# Patient Record
Sex: FEMALE | Race: WHITE
[De-identification: names, ages, dates, MRNs, and addresses within clinical notes are randomized per-mention and may not be internally consistent; named-entity substitution may affect disease eponyms.]

---

## 2017-09-12 NOTE — EDM.PDOC
ED HPI GENERAL MEDICAL PROBLEM





- General


Chief Complaint: Exposure to Heat or Cold


Stated Complaint: NAUSEA / VOMITING


Time Seen by Provider: 09/12/17 19:13


Source of Information: Reports: Patient


History Limitations: Reports: No Limitations





- History of Present Illness


INITIAL COMMENTS - FREE TEXT/NARRATIVE: 





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 She said she got really hot. Afterwards she took a 

shower and then drank some fluids. She had some vomiting after that. Now she 

feels very cold thirsty and has some nausea. She describes her self is healthy. 

She does take aspirin and a statin. She denies any problems with eyes ears nose 

or throat. There is no chest pain palpitations cough or shortness of breath. 

She denies any abdominal pain. No urinary symptoms. No problems with bones or 

joints





- Related Data


 Allergies











Allergy/AdvReac Type Severity Reaction Status Date / Time


 


No Known Allergies Allergy   Verified 09/12/17 19:05











Home Meds: 


 Home Meds





Aspirin 81 mg PO DAILY 09/12/17 [History]











Past Medical History


HEENT History: Reports: Impaired Vision


Cardiovascular History: Reports: High Cholesterol


Gastrointestinal History: Reports: Diverticulosis


OB/GYN History: Reports: Pregnancy


Musculoskeletal History: Reports: Fracture





- Infectious Disease History


Infectious Disease History: Reports: Chicken Pox, Mumps





- Past Surgical History


GI Surgical History: Reports: Other (See Below)


Other GI Surgeries/Procedures: inverse organs?





Social & Family History





- Tobacco Use


Smoking Status *Q: Never Smoker





- Caffeine Use


Caffeine Use: Reports: Coffee





- Recreational Drug Use


Recreational Drug Use: No





ED ROS GENERAL





- Review of Systems


Review Of Systems: See Below


Constitutional: Reports: Chills, Diaphoresis


HEENT: Reports: No Symptoms


Respiratory: Reports: No Symptoms


Cardiovascular: Reports: No Symptoms


Endocrine: Reports: No Symptoms


GI/Abdominal: Reports: Nausea, Vomiting


: Reports: No Symptoms


Musculoskeletal: Reports: No Symptoms


Skin: Reports: No Symptoms





ED EXAM, GENERAL





- Physical Exam


Exam: See Below


Exam Limited By: No Limitations


General Appearance: Alert, WD/WN, No Apparent Distress


Eye Exam: Bilateral Eye: Normal Inspection


Nose: Normal Inspection


Throat/Mouth: Normal Oropharynx


Head: Atraumatic


Neck: Normal Inspection


Respiratory/Chest: Lungs Clear, Normal Breath Sounds


Cardiovascular: Normal Peripheral Pulses, Regular Rate, Rhythm, No Murmur


Back Exam: Normal Inspection


Extremities: Normal Inspection


Neurological: Alert, Oriented


Psychiatric: Normal Affect


Skin Exam: Warm, Dry





Course





- Vital Signs


Last Recorded V/S: 





 Last Vital Signs











Temp  36.6 C   09/12/17 19:07


 


Pulse  76   09/12/17 19:07


 


Resp  16   09/12/17 19:07


 


BP  146/59 H  09/12/17 19:07


 


Pulse Ox  96   09/12/17 19:07














- Orders/Labs/Meds


Orders: 





 Active Orders 24 hr











 Category Date Time Status


 


 EKG Documentation Completion [RC] ASDIRECTED Care  09/12/17 20:19 Active


 


 Chest 2V [CR] Urgent Exams  09/12/17 20:19 Taken


 


 Sodium Chloride 0.9% [Normal Saline] 1,000 ml Med  09/12/17 19:30 Active





 IV ASDIRECTED   


 


 Sodium Chloride 0.9% [Normal Saline] 1,000 ml Med  09/12/17 20:30 Active





 IV ASDIRECTED   


 


 Sodium Chloride 0.9% [Saline Flush] Med  09/12/17 19:27 Active





 10 ml FLUSH ASDIRECTED PRN   


 


 Saline Lock Insert [OM.PC] Urgent Oth  09/12/17 19:27 Ordered


 


 EKG 12 Lead [EK] Urgent Ther  09/12/17 20:18 Ordered








 Medication Orders





Sodium Chloride (Normal Saline)  1,000 mls @ 999 mls/hr IV ASDIRECTED DARIANA


   Last Admin: 09/12/17 19:56  Dose: 999 mls/hr


Sodium Chloride (Normal Saline)  1,000 mls @ 999 mls/hr IV ASDIRECTED DARIANA


   Last Admin: 09/12/17 21:54  Dose: 999 mls/hr


Sodium Chloride (Saline Flush)  10 ml FLUSH ASDIRECTED PRN


   PRN Reason: Keep Vein Open


   Last Admin: 09/12/17 19:56  Dose: 10 ml








Labs: 





 Laboratory Tests











  09/12/17 09/12/17 09/12/17 Range/Units





  19:41 19:41 20:28 


 


WBC  17.4 H    (4.5-11.0)  K/uL


 


RBC  4.82    (3.30-5.50)  M/uL


 


Hgb  14.5    (12.0-15.0)  g/dL


 


Hct  43.8    (36.0-48.0)  %


 


MCV  91    (80-98)  fL


 


MCH  30    (27-31)  pg


 


MCHC  33    (32-36)  %


 


Plt Count  241    (150-400)  K/uL


 


Neut % (Auto)  87 H    (36-66)  %


 


Lymph % (Auto)  5 L    (24-44)  %


 


Mono % (Auto)  7 H    (2-6)  %


 


Eos % (Auto)  1 L    (2-4)  %


 


Baso % (Auto)  0    (0-1)  %


 


Sodium   142   (140-148)  mmol/L


 


Potassium   3.9   (3.6-5.2)  mmol/L


 


Chloride   105   (100-108)  mmol/L


 


Carbon Dioxide   29   (21-32)  mmol/L


 


Anion Gap   7.9   (5.0-14.0)  mmol/L


 


BUN   23 H   (7-18)  mg/dL


 


Creatinine   0.9   (0.6-1.0)  mg/dL


 


Est Cr Clr Drug Dosing   57.56   mL/min


 


Estimated GFR (MDRD)   > 60   (>60)  


 


Glucose   124 H   ()  mg/dL


 


Calcium   9.0   (8.5-10.1)  mg/dL


 


Total Bilirubin   0.3   (0.2-1.0)  mg/dL


 


AST   23   (15-37)  U/L


 


ALT   31   (12-78)  U/L


 


Alkaline Phosphatase   59   ()  U/L


 


Total Protein   7.8   (6.4-8.2)  g/dL


 


Albumin   3.9   (3.4-5.0)  g/dL


 


Globulin   3.9 H   (2.3-3.5)  g/dL


 


Albumin/Globulin Ratio   1.0 L   (1.2-2.2)  


 


Urine Color    Yellow  


 


Urine Appearance    Clear  


 


Urine pH    5.0  (4.5-8.0)  


 


Ur Specific Gravity    1.030  (1.008-1.030)  


 


Urine Protein    Trace  (NEGATIVE)  mg/dL


 


Urine Glucose (UA)    Normal  (NEGATIVE)  mg/dL


 


Urine Ketones    Negative  (NEGATIVE)  mg/dL


 


Urine Occult Blood    Negative  (NEGATIVE)  


 


Urine Nitrite    Negative  (NEGATIVE)  


 


Urine Bilirubin    Small  (NEGATIVE)  


 


Urine Urobilinogen    1  (NORMAL)  mg/dL


 


Ur Leukocyte Esterase    Large  (NEGATIVE)  


 


Urine RBC    0-5  (0-5)  


 


Urine WBC    5-10 H  (0-5)  


 


Ur Epithelial Cells    Few  


 


Amorphous Sediment    Rare  


 


Urine Bacteria    Moderate  


 


Urine Mucus    Numerous  


 


Urine Other      











Meds: 





Medications











Generic Name Dose Route Start Last Admin





  Trade Name Freq  PRN Reason Stop Dose Admin


 


Sodium Chloride  1,000 mls @ 999 mls/hr  09/12/17 19:30  09/12/17 19:56





  Normal Saline  IV   999 mls/hr





  ASDIRECTED DARIANA   Administration


 


Sodium Chloride  1,000 mls @ 999 mls/hr  09/12/17 20:30  09/12/17 21:54





  Normal Saline  IV   999 mls/hr





  ASDIRECTED DARIANA   Administration


 


Sodium Chloride  10 ml  09/12/17 19:27  09/12/17 19:56





  Saline Flush  FLUSH   10 ml





  ASDIRECTED PRN   Administration





  Keep Vein Open   














- Radiology Interpretation


Free Text/Narrative:: 





Chest x-ray shows normal heart size normal lung markings normal bony and soft 

tissues





- Re-Assessments/Exams


Free Text/Narrative Re-Assessment/Exam: 





09/12/17 22:14


An IV was established. She received 2 L IV normal saline. Afterwards she said 

she felt much better and ready to go home.





An EKG shows a sinus rhythm at 76 bpm there is a borderline short SD interval 

borderline right axis deviation T waves are flat in her anterior leads but I don

't see any T-wave inversions. There are no ST changes. She does have a 

prolonged QT interval.





Departure





- Departure


Time of Disposition: 22:15


Disposition: Home, Self-Care 01


Condition: Fair


Clinical Impression: 


 Heat exhaustion due to water depletion








- Discharge Information


Referrals: 


PCP,None [Primary Care Provider] - 


Additional Instructions: 


Be sure to drink plenty of liquids. Rest all day tomorrow in a cool place. 

There are some minor changes on your EKG that may be related to the dehydration 

and heat exhaustion. This is nothing to worry about but you should discuss it 

with your DrEmeli and have the EKG repeated within the next couple of weeks.


Once you have had a heat related illness such as this you or more susceptible 

to a repeat injury so just be careful be sure you drink plenty of liquids and 

avoid the heat





- My Orders


Last 24 Hours: 





My Active Orders





09/12/17 19:27


Sodium Chloride 0.9% [Saline Flush]   10 ml FLUSH ASDIRECTED PRN 


Saline Lock Insert [OM.PC] Urgent 





09/12/17 19:30


Sodium Chloride 0.9% [Normal Saline] 1,000 ml IV ASDIRECTED 





09/12/17 20:18


EKG 12 Lead [EK] Urgent 





09/12/17 20:19


EKG Documentation Completion [RC] ASDIRECTED 


Chest 2V [CR] Urgent 





09/12/17 20:30


Sodium Chloride 0.9% [Normal Saline] 1,000 ml IV ASDIRECTED 














- Assessment/Plan


Last 24 Hours: 





My Active Orders





09/12/17 19:27


Sodium Chloride 0.9% [Saline Flush]   10 ml FLUSH ASDIRECTED PRN 


Saline Lock Insert [OM.PC] Urgent 





09/12/17 19:30


Sodium Chloride 0.9% [Normal Saline] 1,000 ml IV ASDIRECTED 





09/12/17 20:18


EKG 12 Lead [EK] Urgent 





09/12/17 20:19


EKG Documentation Completion [RC] ASDIRECTED 


Chest 2V [CR] Urgent 





09/12/17 20:30


Sodium Chloride 0.9% [Normal Saline] 1,000 ml IV ASDIRECTED

## 2017-09-13 NOTE — CR
Heart size within normal limits. Pulmonary vasculature within normal limits. No focal consolidation.

## 2021-09-10 ENCOUNTER — HOSPITAL ENCOUNTER (INPATIENT)
Dept: HOSPITAL 11 - JP.ED | Age: 71
LOS: 3 days | Discharge: HOME | DRG: 392 | End: 2021-09-13
Attending: INTERNAL MEDICINE | Admitting: INTERNAL MEDICINE
Payer: MEDICARE

## 2021-09-10 DIAGNOSIS — Z79.899: ICD-10-CM

## 2021-09-10 DIAGNOSIS — E78.00: ICD-10-CM

## 2021-09-10 DIAGNOSIS — Z20.822: ICD-10-CM

## 2021-09-10 DIAGNOSIS — K57.20: Primary | ICD-10-CM

## 2021-09-10 DIAGNOSIS — H54.7: ICD-10-CM

## 2021-09-10 DIAGNOSIS — F43.21: ICD-10-CM

## 2021-09-10 DIAGNOSIS — Z79.82: ICD-10-CM

## 2021-09-10 PROCEDURE — U0002 COVID-19 LAB TEST NON-CDC: HCPCS

## 2021-09-10 PROCEDURE — 96376 TX/PRO/DX INJ SAME DRUG ADON: CPT

## 2021-09-10 PROCEDURE — C9113 INJ PANTOPRAZOLE SODIUM, VIA: HCPCS

## 2021-09-10 PROCEDURE — 96365 THER/PROPH/DIAG IV INF INIT: CPT

## 2021-09-10 PROCEDURE — 99285 EMERGENCY DEPT VISIT HI MDM: CPT

## 2021-09-10 PROCEDURE — 74177 CT ABD & PELVIS W/CONTRAST: CPT

## 2021-09-10 PROCEDURE — 96375 TX/PRO/DX INJ NEW DRUG ADDON: CPT

## 2021-09-10 NOTE — CRLCT
For Patients:  As a result of the 21st Century Cures Act, medical imaging 

exams and procedure reports are released immediately into your electronic 

medical record.  You may view this report before your referring provider.  

If you have questions, please contact your health care provider.



Indication:



Right lower quadrant pain, history of situs inversus



Technique:



Volumetric multidetector CT images of the abdomen and pelvis were obtained 

after the administration of intravenous contrast.



111 cc Isovue-300 low osmolar intravenous contrast



Comparison:



None available.



Findings:



There is bibasilar atelectasis.



There is diffuse situs inversus with complete mirrored reversal of all of 

the visceral organs. 



The liver is preserved in attenuation and enhancement without focal 

abnormality.



The portal vein is patent.



The gallbladder is unremarkable without evidence of radiopaque calculus.



There is no significant common biliary ductal dilatation or abrupt cut off.



The spleen is normal in enhancement and size.



There is mild focal thickening of the gastric antrum which can be 

associated with peptic ulcer disease otherwise the stomach is decompressed.



There is mild pancreatic atrophy.



The adrenal glands are unremarkable.



There are parapelvic renal cystic changes of the bilateral kidneys with 

preserved corticomedullary differentiation. There is no evidence of distal 

obstructive uropathy.



There is focal segmental thickening of the sigmoid colon in the right lower 

quadrant with marked pericolonic inflammatory change and likely a small 

contained perforation.



The appendix is unremarkable.



There is no significant mesenteric, retroperitoneal, or pelvic sidewall 

lymph nodes.



The aorta is non aneurysmal with scattered atherosclerotic calcifications.



The solid pelvic viscera are grossly unremarkable.



There is a trace amount of fluid seen in the dependent pelvis otherwise 

there is no intra-abdominal free air or free fluid.



The anterior abdominal wall is intact without significant hernias.



The lumbar vertebral body heights are grossly maintained with minimal 

endplate Schmorl`s defects. There is no evidence of displaced fracture or 

dislocation.



Impression:



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 

visceral organ orientation. 



There is focal segmental thickening and pericolonic inflammation of the 

sigmoid colon within the right lower quadrant with likely a small contained 

perforation commensurate with mildly complicated diverticulitis. 



Mild thickening of the gastric antrum which can be associated with peptic 

ulcer disease. 



Otherwise, no definite acute intra-abdominal abnormality is appreciated.



Please note that all CT scans at this facility use dose modulation, 

iterative reconstruction, and/or weight-based dosing when appropriate to 

reduce radiation dose to as low as reasonably achievable.



Dictated by Nicolas Bales MD @ 9/10/2021 1:49:00 PM



(Electronically Signed)

## 2021-09-10 NOTE — EDM.PDOC
ED HPI GENERAL MEDICAL PROBLEM





- General


Chief Complaint: Abdominal Pain


Stated Complaint: ABD PAIN


Time Seen by Provider: 09/10/21 12:25


Source of Information: Reports: Patient, Old Records, Provider


History Limitations: Reports: No Limitations





- History of Present Illness


INITIAL COMMENTS - FREE TEXT/NARRATIVE: 





70 yo female with a pHx of diverticulitis presents with RLQ pain much like with 

her prior hx of diverticulitis. Has a pHx of situs inversus. Says she has 2 

spleens. Had one episode of vomiting at the clinic where she was initially seen 

before referral to the ER. Got Zofran 4 mg IM at the clinic. Says her pain has 

been coming on for several days and is getting worse. No fever. Had a CBC, BMP, 

and UA done at the clinic that was all normal except for a mildly elevated WBC 

ct. 


Onset: Gradual


Duration: Day(s):, Getting Worse


Location: Reports: Abdomen


Quality: Reports: Ache


Severity: Moderate


Improves with: Reports: Rest


Worsens with: Reports: Movement


Context: Reports: Other (See HPI)


Associated Symptoms: Reports: Nausea/Vomiting.  Denies: Fever/Chills


Treatments PTA: Reports: Other (see below) (Zofran 4 mg IM)


  ** Right Abdomen


Pain Score (Numeric/FACES): 2





- Related Data


                                    Allergies











Allergy/AdvReac Type Severity Reaction Status Date / Time


 


No Known Allergies Allergy   Verified 09/12/17 19:05











Home Meds: 


                                    Home Meds





Aspirin 81 mg PO DAILY 09/12/17 [History]


atorvaSTATin [Lipitor] 10 mg PO BEDTIME 09/10/21 [History]











Past Medical History


HEENT History: Reports: Impaired Vision


Cardiovascular History: Reports: High Cholesterol


Gastrointestinal History: Reports: Diverticulosis


OB/GYN History: Reports: Pregnancy


Musculoskeletal History: Reports: Fracture





- Infectious Disease History


Infectious Disease History: Reports: Chicken Pox, Mumps





- Past Surgical History


GI Surgical History: Reports: Other (See Below)


Other GI Surgeries/Procedures: inverse organs?





Social & Family History





- Tobacco Use


Tobacco Use Status *Q: Never Tobacco User





- Caffeine Use


Caffeine Use: Reports: Coffee





- Recreational Drug Use


Recreational Drug Use: No





ED ROS GENERAL





- Review of Systems


Review Of Systems: See Below


Constitutional: Reports: No Symptoms


HEENT: Reports: No Symptoms


Respiratory: Reports: No Symptoms


Cardiovascular: Reports: No Symptoms


Endocrine: Reports: No Symptoms


GI/Abdominal: Reports: Abdominal Pain, Nausea, Vomiting.  Denies: Black Stool, 

Bloody Stool, Constipation, Diarrhea, Decreased Appetite, Difficulty Swallowing,

 Distension, Hematemesis, Hematochezia


: Reports: No Symptoms


Musculoskeletal: Reports: No Symptoms


Skin: Reports: No Symptoms


Neurological: Reports: No Symptoms


Psychiatric: Reports: No Symptoms





ED EXAM, GI/ABD





- Physical Exam


Exam: See Below


Exam Limited By: No Limitations


General Appearance: Alert, WD/WN, No Apparent Distress


Eyes: Bilateral: Normal Appearance


Ears: Normal External Exam, Normal Canal, Hearing Grossly Normal


Nose: Normal Inspection, No Blood


Throat/Mouth: Normal Inspection, Normal Lips, Normal Voice, No Airway Compromise


Head: Atraumatic, Normocephalic


Neck: Normal Inspection


Respiratory/Chest: No Respiratory Distress, Lungs Clear, Normal Breath Sounds, 

No Accessory Muscle Use


Cardiovascular: Regular Rate, Rhythm, No Edema


GI/Abdominal Exam: Soft, No Distention, Guarding, Rebound, Tender (RLQ).  No: 

Non-Tender, Distended, Rigid


Back Exam: Normal Inspection.  No: CVA Tenderness (R), CVA Tenderness (L)


Extremities: Normal Inspection, Normal Range of Motion, Non-Tender, No Pedal 

Edema


Neurological: Alert, Oriented, CN II-XII Intact, Normal Cognition, No 

Motor/Sensory Deficits


Psychiatric: Normal Affect, Normal Mood


Skin Exam: Warm, Dry, Intact, Normal Color, No Rash





Course





- Vital Signs


Text/Narrative:: 





Neil Rolon called at 1400h





Roxanne Mace called @ 1803h





Dr. Shine called @ 1805h


Last Recorded V/S: 


                                Last Vital Signs











Temp  36.8 C   09/11/21 03:09


 


Pulse  66   09/11/21 03:09


 


Resp  18   09/11/21 03:09


 


BP  112/46 L  09/11/21 03:12


 


Pulse Ox  95   09/11/21 07:05














- Orders/Labs/Meds


Orders: 


                               Active Orders 24 hr











 Category Date Time Status


 


 Lactated Ringers [Ringers, Lactated] 1,000 ml Med  09/10/21 14:00 Active





 IV ASDIRECTED   


 


 Sodium Chloride 0.9% [Saline Flush] Med  09/10/21 12:32 Active





 10 ml FLUSH ASDIRECTED PRN   


 


 Saline Lock Insert [OM.PC] Routine Oth  09/10/21 12:32 Ordered








                                Medication Orders





Acetaminophen (Acetaminophen 325 Mg Tab)  650 mg PO Q4H PRN


   PRN Reason: Fever


   Last Admin: 09/10/21 22:17  Dose: 650 mg


   Documented by: REGAN


Bisacodyl (Bisacodyl 5 Mg Tab)  5 mg PO DAILY PRN


   PRN Reason: Constipation


   Last Admin: 09/10/21 22:17  Dose: 5 mg


   Documented by: REGAN


Docusate Sodium (Docusate Sodium 100 Mg Cap)  100 mg PO BID PRN


   PRN Reason: Constipation


   Last Admin: 09/10/21 22:17  Dose: 100 mg


   Documented by: REGAN


Lactated Ringer's (Ringers, Lactated)  1,000 mls @ 125 mls/hr IV ASDIRECTED DARIANA


   Last Admin: 09/10/21 20:35  Dose: 125 mls/hr


   Documented by: REGAN


   Infusion: 09/10/21 20:35  Dose: 125 mls/hr


   Documented by: REGAN


   Admin: 09/10/21 14:06  Dose: 125 mls/hr


   Documented by: KAY


Piperacillin/Tazobactam/ (Dextrose 4.5 gm/ Premix)  100 mls @ 100 mls/hr IV Q6H 

DARIANA


Lorazepam (Lorazepam 2 Mg/Ml Sdv)  1 mg IV Q6H PRN


   PRN Reason: Nausea/Vomiting


Morphine Sulfate (Morphine 2 Mg/Ml Syringe)  2 mg IVPUSH Q2H PRN


   PRN Reason: Pain (severe 7-10)


Ondansetron HCl (Ondansetron 4 Mg Tab.Dis)  4 mg PO Q6H PRN


   PRN Reason: Nausea able to take PO


Pantoprazole Sodium (Pantoprazole 40 Mg Vial)  40 mg IV BEDTIME DARIANA


   Last Admin: 09/10/21 22:18  Dose: 40 mg


   Documented by: REGAN


Sodium Chloride (Sodium Chloride 0.9% 10 Ml Syringe)  10 ml FLUSH ASDIRECTED PRN


   PRN Reason: Keep Vein Open


   Last Admin: 09/10/21 18:44  Dose: 10 ml


   Documented by: KAY








Labs: 


                                Laboratory Tests











  09/10/21 Range/Units





  14:44 


 


SARS-CoV-2 RNA (PANDA)  Negative  (NEGATIVE)  











Meds: 


Medications











Generic Name Dose Route Start Last Admin





  Trade Name Freq  PRN Reason Stop Dose Admin


 


Acetaminophen  650 mg  09/10/21 20:59  09/10/21 22:17





  Acetaminophen 325 Mg Tab  PO   650 mg





  Q4H PRN   Administration





  Fever  


 


Bisacodyl  5 mg  09/10/21 20:21  09/10/21 22:17





  Bisacodyl 5 Mg Tab  PO   5 mg





  DAILY PRN   Administration





  Constipation  


 


Docusate Sodium  100 mg  09/10/21 20:21  09/10/21 22:17





  Docusate Sodium 100 Mg Cap  PO   100 mg





  BID PRN   Administration





  Constipation  


 


Lactated Ringer's  1,000 mls @ 125 mls/hr  09/10/21 14:00  09/10/21 20:35





  Ringers, Lactated  IV   125 mls/hr





  ASDIRECTED DARIANA   Administration


 


Piperacillin/Tazobactam/  100 mls @ 100 mls/hr  09/11/21 10:00 





  Dextrose 4.5 gm/ Premix  IV  





  Q6H DARIANA  


 


Lorazepam  1 mg  09/10/21 20:21 





  Lorazepam 2 Mg/Ml Sdv  IV  





  Q6H PRN  





  Nausea/Vomiting  


 


Morphine Sulfate  2 mg  09/10/21 20:21 





  Morphine 2 Mg/Ml Syringe  IVPUSH  





  Q2H PRN  





  Pain (severe 7-10)  


 


Ondansetron HCl  4 mg  09/10/21 20:21 





  Ondansetron 4 Mg Tab.Dis  PO  





  Q6H PRN  





  Nausea able to take PO  


 


Pantoprazole Sodium  40 mg  09/10/21 21:00  09/10/21 22:18





  Pantoprazole 40 Mg Vial  IV   40 mg





  BEDTIME DARIANA   Administration


 


Sodium Chloride  10 ml  09/10/21 12:32  09/10/21 18:44





  Sodium Chloride 0.9% 10 Ml Syringe  FLUSH   10 ml





  ASDIRECTED PRN   Administration





  Keep Vein Open  














Discontinued Medications














Generic Name Dose Route Start Last Admin





  Trade Name Freq  PRN Reason Stop Dose Admin


 


Hydromorphone HCl  0.5 mg  09/10/21 15:42  09/10/21 16:01





  Hydromorphone 0.5 Mg/0.5 Ml Syringe  IVPUSH  09/10/21 15:43  0.5 mg





  ONETIME ONE   Administration


 


Hydromorphone HCl  1 mg  09/10/21 18:36  09/10/21 18:43





  Hydromorphone 1 Mg/Ml Syringe  IVPUSH  09/10/21 18:37  1 mg





  ONETIME ONE   Administration


 


Sodium Chloride  74 mls @ 3.4 mls/sec  09/10/21 12:45  09/10/21 12:58





  Normal Saline  IV  09/10/21 12:46  3.4 mls/sec





  ASDIRECTED DARIANA   Administration


 


Piperacillin/Tazobactam/  50 mls @ 100 mls/hr  09/10/21 14:00  09/10/21 14:06





  Dextrose 3.375 gm/ Premix  IV  09/10/21 14:29  100 mls/hr





  ONETIME ONE   Administration


 


Piperacillin Sod/Tazobactam  100 mls @ 100 mls/hr  09/10/21 21:00  09/11/21 

03:09





  Sod 4.5 gm/ Sodium Chloride  IV   100 mls/hr





  Q6H DARIANA   Administration


 


Iopamidol  111 ml  09/10/21 12:39  09/10/21 12:57





  Iopamidol 612 Mg/Ml 500 Ml Multipack Bottle  IV  09/10/21 12:40  111 ml





  ONETIME ONE   Administration


 


Sodium Chloride  10 ml  09/10/21 12:39  09/10/21 12:57





  Sodium Chloride 0.9% 10 Ml Syringe  FLUSH  09/10/21 12:40  10 ml





  ONETIME ONE   Administration














- Radiology Interpretation


Free Text/Narrative:: 





CT abd/pelvis with IV contrast-


Impression:


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 

visceral organ orientation. 





There is focal segmental thickening and pericolonic inflammation of the sigmoid 

colon within the right lower quadrant with likely a small contained perforation 

commensurate with mildly complicated diverticulitis. 





Mild thickening of the gastric antrum which can be associated with peptic ulcer 

disease. 





Otherwise, no definite acute intra-abdominal abnormality is appreciated.








Please note that all CT scans at this facility use dose modulation, iterative 

reconstruction, and/or weight-based dosing when appropriate to reduce radiation 

dose to as low as reasonably achievable.





Dictated by Nicolas Bales MD @ 9/10/2021 1:49:00 PM


CT Results Date: 09/10/21


CT Results Time: 13:51





Departure





- Departure


Time of Disposition: 18:00


Disposition: Admitted As Inpatient 67


Condition: Fair


Clinical Impression: 


 Perforation of sigmoid colon due to diverticulitis








- Discharge Information


*PRESCRIPTION DRUG MONITORING PROGRAM REVIEWED*: Not Applicable


*COPY OF PRESCRIPTION DRUG MONITORING REPORT IN PATIENT KASEY: Not Applicable





Sepsis Event Note (ED)





- Evaluation


Sepsis Screening Result: No Definite Risk





- My Orders


Last 24 Hours: 


My Active Orders





09/10/21 12:32


Sodium Chloride 0.9% [Saline Flush]   10 ml FLUSH ASDIRECTED PRN 


Saline Lock Insert [OM.PC] Routine 





09/10/21 14:00


Lactated Ringers [Ringers, Lactated] 1,000 ml IV ASDIRECTED 














- Assessment/Plan


Last 24 Hours: 


My Active Orders





09/10/21 12:32


Sodium Chloride 0.9% [Saline Flush]   10 ml FLUSH ASDIRECTED PRN 


Saline Lock Insert [OM.PC] Routine 





09/10/21 14:00


Lactated Ringers [Ringers, Lactated] 1,000 ml IV ASDIRECTED
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
## 2021-09-11 RX ADMIN — TAZOBACTAM SODIUM AND PIPERACILLIN SODIUM SCH MLS/HR: 375; 3 INJECTION, SOLUTION INTRAVENOUS at 21:31

## 2021-09-11 RX ADMIN — TAZOBACTAM SODIUM AND PIPERACILLIN SODIUM SCH MLS/HR: 375; 3 INJECTION, SOLUTION INTRAVENOUS at 15:32
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
## 2021-09-12 RX ADMIN — TAZOBACTAM SODIUM AND PIPERACILLIN SODIUM SCH MLS/HR: 375; 3 INJECTION, SOLUTION INTRAVENOUS at 09:40

## 2021-09-12 RX ADMIN — TAZOBACTAM SODIUM AND PIPERACILLIN SODIUM SCH MLS/HR: 375; 3 INJECTION, SOLUTION INTRAVENOUS at 03:36

## 2021-09-12 RX ADMIN — TAZOBACTAM SODIUM AND PIPERACILLIN SODIUM SCH MLS/HR: 375; 3 INJECTION, SOLUTION INTRAVENOUS at 21:00

## 2021-09-12 RX ADMIN — TAZOBACTAM SODIUM AND PIPERACILLIN SODIUM SCH MLS/HR: 375; 3 INJECTION, SOLUTION INTRAVENOUS at 16:08
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
## 2021-09-13 VITALS — DIASTOLIC BLOOD PRESSURE: 62 MMHG | HEART RATE: 69 BPM | SYSTOLIC BLOOD PRESSURE: 130 MMHG

## 2021-09-13 RX ADMIN — TAZOBACTAM SODIUM AND PIPERACILLIN SODIUM SCH MLS/HR: 375; 3 INJECTION, SOLUTION INTRAVENOUS at 09:31

## 2021-09-13 RX ADMIN — TAZOBACTAM SODIUM AND PIPERACILLIN SODIUM SCH MLS/HR: 375; 3 INJECTION, SOLUTION INTRAVENOUS at 04:03

## 2021-09-13 NOTE — PCM.DCSUM1
**Discharge Summary





- Hospital Course


Brief History: Ms. Castillo is a 71-year-old woman who was admitted with 

abdominal pain and elevated white count secondary to sigmoid diverticulitis.





- Discharge Data


Discharge Date: 09/13/21


Discharge Disposition: Home, Self-Care 01


Condition: Fair





- Referral to Home Health


Primary Care Physician: 


PCP None








- Discharge Diagnosis/Problem(s)


(1) Perforation of sigmoid colon due to diverticulitis


SNOMED Code(s): 0116016238855723


   ICD Code: K57.20 - DVTRCLI OF LG INT W PERFORATION AND ABSCESS W/O BLEEDING  

Status: Acute   Priority: High   Current Visit: Yes   





- Patient Summary/Data


Hospital Course: 





Ms. Castillo is a 71-year-old woman who was admitted with a diagnosis of 

diverticulitis.  She has a known history of diverticulosis as well as previous 

episodes of diverticulitis.  She experienced right lower quadrant abdominal pain

for about a week prior to coming in for evaluation.  On assessment white blood 

cell count was found to be elevated and CT scan of the abdomen and pelvis did 

document sigmoid diverticulitis with associated very small contained 

perforation.  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  She

was admitted to the hospital and given IV fluids as well as pain medication.  

She was started on IV antibiotic therapy with Zosyn.  Over the next 3 days of 

her hospital stay she remained on IV antibiotics.  White blood cell count 

normalized and she had been afebrile for at least 24 hours prior to discharge.  

She continued to experience some right lower quadrant abdominal pain but states 

that this is present on a continuous basis for the past few years.  She will be 

discharged home on oral antibiotic therapy with Augmentin twice daily for an 

additional 11 days.  Activity will be as tolerated and she will be on a soft low

residue diet.  Follow-up appointment will be scheduled with primary care 

provider within 1 week.  She is instructed to return immediately to the 

emergency department if she experiences recurrent fevers or increase in pain.





- Patient Instructions


Diet: GI Soft/Low Residue/Low Fiber


Activity: As Tolerated


Other/Special Instructions: Please schedule follow-up appointment with primary 

care provider within 1 week.





- Discharge Plan


*PRESCRIPTION DRUG MONITORING PROGRAM REVIEWED*: Not Applicable


*COPY OF PRESCRIPTION DRUG MONITORING REPORT IN PATIENT KASEY: Not Applicable


Prescriptions/Med Rec: 


Amoxicillin/Potassium Clav [Augmentin 875-125 Tablet] 1 each PO BID #22 tablet


Home Medications: 


                                    Home Meds





Aspirin 81 mg PO DAILY 09/12/17 [History]


atorvaSTATin [Lipitor] 10 mg PO BEDTIME 09/10/21 [History]


Amoxicillin/Potassium Clav [Augmentin 875-125 Tablet] 1 each PO BID #22 tablet 

09/13/21 [Rx]








Referrals: 


Savana Sepulveda MD [Ordering Only Provider] - 





- Discharge Summary/Plan Comment


DC Time >30 min.: No


Total # of Minutes for Discharge Time: 





15





- Patient Data


Vitals - Most Recent: 


                                Last Vital Signs











Temp  97.2 F   09/13/21 01:32


 


Pulse  69   09/13/21 09:35


 


Resp  16   09/13/21 09:35


 


BP  130/62   09/13/21 09:35


 


Pulse Ox  96   09/13/21 09:35











Weight - Most Recent: 164 lb


I&O - Last 24 hours: 


                                 Intake & Output











 09/12/21 09/13/21 09/13/21





 22:59 06:59 14:59


 


Intake Total 710 350 


 


Balance 710 350 











Lab Results - Last 24 hrs: 


                         Laboratory Results - last 24 hr











  09/13/21 09/13/21 Range/Units





  05:30 05:30 


 


WBC  4.6   (4.5-11.0)  K/uL


 


RBC  4.15   (3.30-5.50)  M/uL


 


Hgb  12.2   (12.0-15.0)  g/dL


 


Hct  37.9   (36.0-48.0)  %


 


MCV  91   (80-98)  fL


 


MCH  29   (27-31)  pg


 


MCHC  32   (32-36)  %


 


Plt Count  210   (150-400)  K/uL


 


C-Reactive Protein   5.05 H  (0.0-0.3)  mg/dL











RICHARD Results - Last 24 hrs: 


                                  Microbiology











 09/10/21 21:25 Aerobic Blood Culture - Preliminary





 Blood - Arm, Right    NO GROWTH AFTER 2 DAYS





 Anaerobic Blood Culture - Preliminary





    NO GROWTH AFTER 2 DAYS


 


 09/10/21 21:15 Aerobic Blood Culture - Preliminary





 Blood - Arm, Right    NO GROWTH AFTER 2 DAYS





 Anaerobic Blood Culture - Preliminary





    NO GROWTH AFTER 2 DAYS











Med Orders - Current: 


                               Current Medications





Acetaminophen (Acetaminophen 325 Mg Tab)  650 mg PO Q4H PRN


   PRN Reason: Fever


   Last Admin: 09/11/21 15:31 Dose:  650 mg


   Documented by: 


Bisacodyl (Bisacodyl 5 Mg Tab)  5 mg PO DAILY PRN


   PRN Reason: Constipation


   Last Admin: 09/12/21 07:36 Dose:  5 mg


   Documented by: 


Docusate Sodium (Docusate Sodium 100 Mg Cap)  100 mg PO BID PRN


   PRN Reason: Constipation


   Last Admin: 09/12/21 07:36 Dose:  100 mg


   Documented by: 


Piperacillin/Tazobactam/ (Dextrose 3.375 gm/ Premix)  50 mls @ 100 mls/hr IV Q6H

[facility]


   Last Admin: 09/13/21 09:31 Dose:  100 mls/hr


   Documented by: 


Lorazepam (Lorazepam 2 Mg/Ml Sdv)  0.5 mg IV Q6H PRN


   PRN Reason: Nausea/Vomiting


Morphine Sulfate (Morphine 2 Mg/Ml Syringe)  2 mg IVPUSH Q2H PRN


   PRN Reason: Pain (severe 7-10)


Ondansetron HCl (Ondansetron 4 Mg Tab.Dis)  4 mg PO Q6H PRN


   PRN Reason: Nausea able to take PO


Pantoprazole Sodium (Pantoprazole 40 Mg Tab.Cr)  40 mg PO BEDTIME [facility]


   Last Admin: 09/12/21 20:57 Dose:  40 mg


   Documented by: 


Sodium Chloride (Sodium Chloride 0.9% 10 Ml Syringe)  10 ml FLUSH ASDIRECTED PRN


   PRN Reason: Keep Vein Open


   Last Admin: 09/10/21 18:44 Dose:  10 ml


   Documented by: 


Tramadol HCl (Tramadol 50 Mg Tab)  50 mg PO Q6H PRN


   PRN Reason: Pain (moderate 4-6)





Discontinued Medications





Hydromorphone HCl (Hydromorphone 0.5 Mg/0.5 Ml Syringe)  0.5 mg IVPUSH ONETIME 

ONE


   Stop: 09/10/21 15:43


   Last Admin: 09/10/21 16:01 Dose:  0.5 mg


   Documented by: 


Hydromorphone HCl (Hydromorphone 1 Mg/Ml Syringe)  1 mg IVPUSH ONETIME ONE


   Stop: 09/10/21 18:37


   Last Admin: 09/10/21 18:43 Dose:  1 mg


   Documented by: 


Sodium Chloride (Normal Saline)  74 mls @ 3.4 mls/sec IV ASDIRECTED [facility]


   Stop: 09/10/21 12:46


   Last Admin: 09/10/21 12:58 Dose:  3.4 mls/sec


   Documented by: 


Lactated Ringer's (Ringers, Lactated)  1,000 mls @ 125 mls/hr IV ASDIRECTED [facility]


   Last Admin: 09/11/21 08:25 Dose:  125 mls/hr


   Documented by: 


Piperacillin/Tazobactam/ (Dextrose 3.375 gm/ Premix)  50 mls @ 100 mls/hr IV 

ONETIME ONE


   Stop: 09/10/21 14:29


   Last Admin: 09/10/21 14:06 Dose:  100 mls/hr


   Documented by: 


Piperacillin Sod/Tazobactam (Sod 4.5 gm/ Sodium Chloride)  100 mls @ 100 mls/hr 

IV Q6H [facility]


   Last Admin: 09/11/21 03:09 Dose:  100 mls/hr


   Documented by: 


Piperacillin/Tazobactam/ (Dextrose 4.5 gm/ Premix)  100 mls @ 100 mls/hr IV Q6H 

[facility]


   Last Admin: 09/11/21 09:46 Dose:  100 mls/hr


   Documented by: 


Iopamidol (Iopamidol 612 Mg/Ml 500 Ml Multipack Bottle)  111 ml IV ONETIME ONE


   Stop: 09/10/21 12:40


   Last Admin: 09/10/21 12:57 Dose:  111 ml


   Documented by: 


Lorazepam (Lorazepam 2 Mg/Ml Sdv)  1 mg IV Q6H PRN


   PRN Reason: Nausea/Vomiting


Pantoprazole Sodium (Pantoprazole 40 Mg Vial)  40 mg IV BEDTIME [facility]


   Last Admin: 09/10/21 22:18 Dose:  40 mg


   Documented by: 


Sodium Chloride (Sodium Chloride 0.9% 10 Ml Syringe)  10 ml FLUSH ONETIME ONE


   Stop: 09/10/21 12:40


   Last Admin: 09/10/21 12:57 Dose:  10 ml


   Documented by: 











- Exam


Quality Assessment: Reports: DVT Prophylaxis


General: Reports: Alert, Oriented, Cooperative, No Acute Distress


Lungs: Reports: Clear to Auscultation, Normal Respiratory Effort


Cardiovascular: Reports: Regular Rate, Regular Rhythm, No Murmurs


GI/Abdominal Exam: Soft, No Organomegaly, Tender.  No: Distended, Guarding, 

Rigid, Rebound


Extremities: Non-Tender, No Pedal Edema

## 2022-11-28 ENCOUNTER — HOSPITAL ENCOUNTER (OUTPATIENT)
Dept: HOSPITAL 11 - JP.SDS | Age: 72
Discharge: HOME | End: 2022-11-28
Attending: STUDENT IN AN ORGANIZED HEALTH CARE EDUCATION/TRAINING PROGRAM
Payer: MEDICARE

## 2022-11-28 VITALS — SYSTOLIC BLOOD PRESSURE: 128 MMHG | HEART RATE: 58 BPM | DIASTOLIC BLOOD PRESSURE: 56 MMHG

## 2022-11-28 DIAGNOSIS — R19.5: Primary | ICD-10-CM

## 2022-11-28 DIAGNOSIS — E78.5: ICD-10-CM

## 2022-11-28 DIAGNOSIS — Z79.899: ICD-10-CM

## 2022-11-28 PROCEDURE — 45378 DIAGNOSTIC COLONOSCOPY: CPT

## 2022-11-29 ENCOUNTER — HOSPITAL ENCOUNTER (OUTPATIENT)
Dept: HOSPITAL 11 - JP.SDS | Age: 72
Discharge: HOME | End: 2022-11-29
Attending: STUDENT IN AN ORGANIZED HEALTH CARE EDUCATION/TRAINING PROGRAM
Payer: MEDICARE

## 2022-11-29 VITALS — HEART RATE: 84 BPM | DIASTOLIC BLOOD PRESSURE: 60 MMHG | SYSTOLIC BLOOD PRESSURE: 157 MMHG

## 2022-11-29 DIAGNOSIS — R19.5: Primary | ICD-10-CM

## 2022-11-29 DIAGNOSIS — K57.30: ICD-10-CM

## 2022-11-29 DIAGNOSIS — Z79.899: ICD-10-CM
